# Patient Record
Sex: FEMALE | Race: WHITE | NOT HISPANIC OR LATINO | URBAN - METROPOLITAN AREA
[De-identification: names, ages, dates, MRNs, and addresses within clinical notes are randomized per-mention and may not be internally consistent; named-entity substitution may affect disease eponyms.]

---

## 2018-09-25 ENCOUNTER — APPOINTMENT (OUTPATIENT)
Dept: URBAN - METROPOLITAN AREA CLINIC 217 | Age: 73
Setting detail: DERMATOLOGY
End: 2018-09-25

## 2018-09-25 DIAGNOSIS — D485 NEOPLASM OF UNCERTAIN BEHAVIOR OF SKIN: ICD-10-CM

## 2018-09-25 PROBLEM — E03.9 HYPOTHYROIDISM, UNSPECIFIED: Status: ACTIVE | Noted: 2018-09-25

## 2018-09-25 PROBLEM — I10 ESSENTIAL (PRIMARY) HYPERTENSION: Status: ACTIVE | Noted: 2018-09-25

## 2018-09-25 PROBLEM — D48.5 NEOPLASM OF UNCERTAIN BEHAVIOR OF SKIN: Status: ACTIVE | Noted: 2018-09-25

## 2018-09-25 PROCEDURE — OTHER MIPS QUALITY: OTHER

## 2018-09-25 PROCEDURE — 11302 SHAVE SKIN LESION 1.1-2.0 CM: CPT

## 2018-09-25 PROCEDURE — OTHER SHAVE REMOVAL: OTHER

## 2018-09-25 PROCEDURE — OTHER COUNSELING: OTHER

## 2018-09-25 ASSESSMENT — LOCATION DETAILED DESCRIPTION DERM: LOCATION DETAILED: RIGHT LATERAL SUPERIOR CHEST

## 2018-09-25 ASSESSMENT — LOCATION SIMPLE DESCRIPTION DERM: LOCATION SIMPLE: CHEST

## 2018-09-25 ASSESSMENT — LOCATION ZONE DERM: LOCATION ZONE: TRUNK

## 2018-09-25 NOTE — PROCEDURE: SHAVE REMOVAL
Post-Care Instructions: I reviewed with the patient in detail post-care instructions. Patient is to keep the office bandage on and dry overnight, and then apply Polysporin, Bacitracin or Vasoline daily until healed.  Patient may irrigate with water to remove any crusting.  Wound Care written instructions given.
Medical Necessity Information: It is in your best interest to select a reason for this procedure from the list below. All of these items fulfill various CMS LCD requirements except the new and changing color options.
Was A Bandage Applied: Yes
Bill For Surgical Tray: no
Hemostasis: Aluminum Chloride and Electrocautery
Size Of Lesion In Cm (Required): 1.6
Anesthesia Volume In Cc: 0.4
Consent was obtained from the patient. The risks and benefits to therapy were discussed in detail. Specifically, the risks of infection, scarring, bleeding, prolonged wound healing, incomplete removal, allergy to anesthesia, nerve injury and recurrence were addressed. Prior to the procedure, the treatment site was clearly identified and confirmed by the patient.
Wound Care: Vaseline
Detail Level: Detailed
Path Notes (To The Dermatopathologist): Cc: Dr. Kip Barakat
Anticipated Plan (Based On Presumed Biopsy Results): Further management pending pathology report
Anesthesia Type: 1% Xylocaine without epinephrine
Billing Type: Third-Party Bill
Medical Necessity Clause: This procedure was medically necessary and not cosmetic in nature due to the following is enlarging and changing color
Biopsy Method: Dermablade

## 2018-09-25 NOTE — PROCEDURE: MIPS QUALITY
Quality 226: Preventive Care And Screening: Tobacco Use: Screening And Cessation Intervention: Patient screened for tobacco and never smoked
Detail Level: Detailed
Quality 111:Pneumonia Vaccination Status For Older Adults: Pneumococcal Vaccination Previously Received
Quality 110: Preventive Care And Screening: Influenza Immunization: Influenza Immunization not Administered because Patient Refused.